# Patient Record
Sex: FEMALE | Race: BLACK OR AFRICAN AMERICAN | Employment: PART TIME | URBAN - NONMETROPOLITAN AREA
[De-identification: names, ages, dates, MRNs, and addresses within clinical notes are randomized per-mention and may not be internally consistent; named-entity substitution may affect disease eponyms.]

---

## 2020-08-18 VITALS
WEIGHT: 231.6 LBS | SYSTOLIC BLOOD PRESSURE: 131 MMHG | RESPIRATION RATE: 18 BRPM | TEMPERATURE: 97.9 F | BODY MASS INDEX: 34.3 KG/M2 | HEART RATE: 82 BPM | OXYGEN SATURATION: 96 % | HEIGHT: 69 IN | DIASTOLIC BLOOD PRESSURE: 80 MMHG

## 2020-08-18 DIAGNOSIS — M32.9 SYSTEMIC LUPUS ERYTHEMATOSUS, UNSPECIFIED SLE TYPE, UNSPECIFIED ORGAN INVOLVEMENT STATUS (HCC): ICD-10-CM

## 2020-08-18 DIAGNOSIS — R79.89 OTHER SPECIFIED ABNORMAL FINDINGS OF BLOOD CHEMISTRY: ICD-10-CM

## 2020-08-18 DIAGNOSIS — I10 ESSENTIAL (PRIMARY) HYPERTENSION: Chronic | ICD-10-CM

## 2020-08-18 DIAGNOSIS — R74.02 NONSPECIFIC ELEVATION OF LEVELS OF TRANSAMINASE AND LACTIC ACID DEHYDROGENASE (LDH): ICD-10-CM

## 2020-08-18 DIAGNOSIS — R74.01 NONSPECIFIC ELEVATION OF LEVELS OF TRANSAMINASE AND LACTIC ACID DEHYDROGENASE (LDH): ICD-10-CM

## 2020-08-18 DIAGNOSIS — E55.9 VITAMIN D DEFICIENCY, UNSPECIFIED: ICD-10-CM

## 2020-08-18 PROBLEM — R63.4 EXCESSIVE WEIGHT LOSS: Status: ACTIVE | Noted: 2017-12-27

## 2020-08-18 PROBLEM — R49.0 CHRONIC HOARSENESS: Status: ACTIVE | Noted: 2017-12-27

## 2020-08-18 PROBLEM — B02.9 HERPES ZOSTER: Status: ACTIVE | Noted: 2017-06-27

## 2020-08-18 PROBLEM — D64.9 ANEMIA: Status: ACTIVE | Noted: 2017-06-29

## 2020-08-18 PROBLEM — E88.09 HYPOALBUMINEMIA: Status: ACTIVE | Noted: 2018-01-09

## 2020-08-18 PROBLEM — R80.9 MICROALBUMINURIA: Status: ACTIVE | Noted: 2018-02-02

## 2020-08-18 PROBLEM — M19.90 UNSPECIFIED OSTEOARTHRITIS, UNSPECIFIED SITE: Status: ACTIVE | Noted: 2017-06-27

## 2020-08-18 RX ORDER — ALBUTEROL SULFATE 90 UG/1
2 AEROSOL, METERED RESPIRATORY (INHALATION)
COMMUNITY

## 2020-08-18 RX ORDER — CHOLECALCIFEROL (VITAMIN D3) 125 MCG
CAPSULE ORAL
COMMUNITY

## 2020-12-09 NOTE — PROGRESS NOTES
I am seeing this patient today virtually using HIPAA-compliant video-conferencing technology due to the COVID-19 Pandemic. The patient has previously provided full consent to use this technology and understands the risks and benefits of proceeding. I am seeing the patient today from my office in [place], [place]. The patient is in his/her home located within [place]. Patient already made aware that this is a virtual visit with provider and that for the purposes of billing, we will submit a claim for reimbursement with your insurance company. He/She was informed that he/she will be responsible for any copays, coinsurance amounts or other amounts not covered by his/her insurance company. Patient consented and accepted terms of virtual visit. THIS VISIT WAS CONDUCTED OVER THE TELEPHONE    HPI    Feliz Goddard is a 56 y.o. female and presents today for Hypertension (f/u)  Following up on chronic diseases today. She has a history of lupus and chronic osteoarthritis. She sees Rheumatology regularly. She also has a history of hypertension and takes medication daily for this. Home bps are ok. She takes all medications as prescribed. She does have history of chronic kidney disease in which she was seeing specialist in 2018 but has not been back to see them due to financial reasons. She has complaints of gerd today. Pap:  colonoscopy:5/2017  mammo:per gyn    Allergies    Allergies   Allergen Reactions    Codeine Other (comments)     Patient states she gets depressed when taking codeine. Medications    Current Outpatient Medications   Medication Sig Dispense    hydroCHLOROthiazide (MICROZIDE) 12.5 mg capsule Take 12.5 mg by mouth daily.  hydrOXYchloroQUINE (PLAQUENIL) 200 mg tablet Take 200 mg by mouth two (2) times a day.  predniSONE (DELTASONE) 5 mg tablet Take  by mouth daily.  ascorbic acid, vitamin C, (Vitamin C) 500 mg tablet Take  by mouth.      multivits,Stress Formula-Zinc tablet Take 1 Tab by mouth daily.  vitamin E (AQUA GEMS) 400 unit capsule Take  by mouth daily.  PNV Combo No.47-Iron-FA #1-DHA 27 mg iron-1 mg -300 mg cap Take  by mouth.  cholecalciferol, vitamin D3, (Vitamin D3) 50 mcg (2,000 unit) tab Take  by mouth. 3x a week     albuterol (Ventolin HFA) 90 mcg/actuation inhaler Take 2 Puffs by inhalation every four (4) hours as needed for Wheezing. No current facility-administered medications for this visit.          Screening  phq neg    Health Maintenance    Health Maintenance Due   Topic Date Due    Hepatitis C Screening  1960    PAP AKA CERVICAL CYTOLOGY  12/09/1979    Lipid Screen  12/09/1998    Colorectal Cancer Screening Combo  12/09/2008    Breast Cancer Screen Mammogram  12/09/2008    Flu Vaccine (1) 09/01/2020        Problem List    Patient Active Problem List    Diagnosis Date Noted    Gastroesophageal reflux disease without esophagitis 12/11/2020    Systemic lupus erythematosus, unspecified ([facility] [facility] 12/13/2019    Microalbuminuria 02/02/2018    Hypoalbuminemia 01/09/2018    Chronic hoarseness 12/27/2017    Excessive weight loss 12/27/2017    Other specified abnormal findings of blood chemistry 12/27/2017    Vitamin D deficiency, unspecified 07/12/2017    Anemia 06/29/2017    Nonspecific elevation of levels of transaminase and lactic acid dehydrogenase (LDH) 06/29/2017    Unspecified osteoarthritis, unspecified site 06/27/2017    Essential (primary) hypertension 06/27/2017    Herpes zoster 06/27/2017        Family Hx    Family History   Problem Relation Age of Onset    COPD Mother     Diabetes Father         Social Hx    Social History     Socioeconomic History    Marital status:      Spouse name: Not on file    Number of children: Not on file    Years of education: Not on file    Highest education level: Not on file   Tobacco Use    Smoking status: Never Smoker    Smokeless tobacco: Never Used   Substance and Sexual Activity    Alcohol use: Yes     Comment: occ    Drug use: Never   Other Topics Concern        Surgical Hx    Past Surgical History:   Procedure Laterality Date    HX COLONOSCOPY          Vitals    There were no vitals taken for this visit. Patient-Reported Vitals 12/11/2020   Patient-Reported Pulse 58   Patient-Reported Temperature 97.7   Patient-Reported Systolic  104   Patient-Reported Diastolic 76        ROS    Review of Systems   Constitutional: Negative for chills, fever and malaise/fatigue. HENT: Negative for congestion, ear pain, sinus pain and sore throat. Eyes: Negative for blurred vision and redness. Respiratory: Negative for cough, sputum production, shortness of breath and wheezing. Cardiovascular: Negative for chest pain, palpitations and leg swelling. Gastrointestinal: Negative for abdominal pain, constipation, diarrhea, heartburn, nausea and vomiting. Genitourinary: Negative for dysuria and hematuria. Musculoskeletal: Negative for falls, joint pain and myalgias. Skin: Negative for rash. Neurological: Negative for dizziness, seizures, weakness and headaches. Endo/Heme/Allergies: Does not bruise/bleed easily. Psychiatric/Behavioral: Negative for depression and suicidal ideas. The patient does not have insomnia. Physical Exam    Patient is a 56y.o. year old female     Physical exam was deferred due to [facility]- 19 precautions as visit was completed via telemedicine. Assessment/Plan  1. Essential (primary) hypertension  BPs are looking great; she is taking hydrochlorothiazide 12.5 mg daily; continue current regimen; Cont to monitor bp and report consistent readings >140/80 or <100/60.   - CBC W/O DIFF; Future  - METABOLIC PANEL, COMPREHENSIVE; Future  - LIPID PANEL; Future    2. Osteoarthritis, unspecified osteoarthritis type, unspecified site  Follows with rheumatology regularly    3.  Systemic lupus erythematosus, unspecified SLE type, unspecified organ involvement [facility]  Follows with rheumatology regularly    4. Vitamin D deficiency, unspecified  Currently on a supplement    5. Gastroesophageal reflux disease without esophagitis  Using famotidine as needed    6. Encounter for screening mammogram for malignant neoplasm of breast  Due: Ordering today  - [facility] MAMMO BI SCREENING INCL CAD; Future    7. Prediabetes  History of this; most recent A1c 8.7; will check labs and call with results  - HEMOGLOBIN A1C WITH EAG; Future    8. Encounter for hepatitis C screening test for low risk patient  Never had; ordering today  - HEPATITIS C AB         Health Maintenance Items reviewed with patient as noted. 25 minutes spent with patient/reviewing records during virtual appt discussing diagnoses, treatment options, and answering any patient questions.     No Carlson, MSN, FNP-BC

## 2020-12-11 ENCOUNTER — VIRTUAL VISIT (OUTPATIENT)
Dept: FAMILY MEDICINE CLINIC | Age: 60
End: 2020-12-11
Payer: COMMERCIAL

## 2020-12-11 DIAGNOSIS — M19.90 OSTEOARTHRITIS, UNSPECIFIED OSTEOARTHRITIS TYPE, UNSPECIFIED SITE: ICD-10-CM

## 2020-12-11 DIAGNOSIS — E55.9 VITAMIN D DEFICIENCY, UNSPECIFIED: ICD-10-CM

## 2020-12-11 DIAGNOSIS — R73.03 PREDIABETES: ICD-10-CM

## 2020-12-11 DIAGNOSIS — Z12.31 ENCOUNTER FOR SCREENING MAMMOGRAM FOR MALIGNANT NEOPLASM OF BREAST: ICD-10-CM

## 2020-12-11 DIAGNOSIS — M32.9 SYSTEMIC LUPUS ERYTHEMATOSUS, UNSPECIFIED SLE TYPE, UNSPECIFIED ORGAN INVOLVEMENT STATUS (HCC): ICD-10-CM

## 2020-12-11 DIAGNOSIS — K21.9 GASTROESOPHAGEAL REFLUX DISEASE WITHOUT ESOPHAGITIS: ICD-10-CM

## 2020-12-11 DIAGNOSIS — I10 ESSENTIAL (PRIMARY) HYPERTENSION: Chronic | ICD-10-CM

## 2020-12-11 DIAGNOSIS — Z11.59 ENCOUNTER FOR HEPATITIS C SCREENING TEST FOR LOW RISK PATIENT: ICD-10-CM

## 2020-12-11 DIAGNOSIS — I10 ESSENTIAL (PRIMARY) HYPERTENSION: Primary | Chronic | ICD-10-CM

## 2020-12-11 PROCEDURE — 99443 PR PHYS/QHP TELEPHONE EVALUATION 21-30 MIN: CPT | Performed by: NURSE PRACTITIONER

## 2020-12-11 RX ORDER — FAMOTIDINE 20 MG/1
20 TABLET, FILM COATED ORAL
COMMUNITY

## 2020-12-11 RX ORDER — HYDROCHLOROTHIAZIDE 12.5 MG/1
12.5 CAPSULE ORAL DAILY
COMMUNITY
End: 2021-01-08 | Stop reason: SDUPTHER

## 2020-12-11 RX ORDER — ASCORBIC ACID 500 MG
TABLET ORAL
COMMUNITY

## 2020-12-11 RX ORDER — VITAMIN E 268 MG
CAPSULE ORAL DAILY
COMMUNITY

## 2020-12-11 RX ORDER — PREDNISONE 5 MG/1
TABLET ORAL DAILY
COMMUNITY

## 2020-12-11 RX ORDER — HYDROXYCHLOROQUINE SULFATE 200 MG/1
200 TABLET, FILM COATED ORAL 2 TIMES DAILY
COMMUNITY

## 2020-12-11 NOTE — PROGRESS NOTES
Acid reflux concern. Has been taking OTC          Bertha Cross presents today for   Chief Complaint   Patient presents with    Hypertension     f/u       Depression Screening:  No flowsheet data found. Learning Assessment:  No flowsheet data found. Fall Risk  No flowsheet data found. ADL  No flowsheet data found. Health Maintenance reviewed and discussed and ordered per Provider. Health Maintenance Due   Topic Date Due    Hepatitis C Screening  1960    DTaP/Tdap/Td series (1 - Tdap) 12/09/1979    PAP AKA CERVICAL CYTOLOGY  12/09/1979    Lipid Screen  12/09/1998    Shingrix Vaccine Age 50> (1 of 2) 12/09/2008    Colorectal Cancer Screening Combo  12/09/2008    Breast Cancer Screen Mammogram  12/09/2008    Flu Vaccine (1) 09/01/2020   . Coordination of Care:  1. Have you been to the ER, urgent care clinic since your last visit? Hospitalized since your last visit? no    2. Have you seen or consulted any other health care providers outside of the [address] [place] [address] since your last visit? Include any pap smears or colon screening.  no

## 2021-01-29 LAB
CREATININE, EXTERNAL: 1.46
HBA1C MFR BLD HPLC: 5.9 %
LDL-C, EXTERNAL: 113

## 2021-02-01 NOTE — PROGRESS NOTES
I reviewed her blood work and noted that her kidney function is a little decreased when compared to previous labs done greater than 1 year ago;  I think she has seen nephrology in the past but I am not sure if she is seeing them within the past year; I would like her to reconnect with Dr. Marielle Houston for follow-up on her decreased kidney function; otherwise, all of her other labs look pretty good; her A1c is 5.9 which is technically mild prediabetes so I recommend she clean up her diet to include cutting down on carbs and sugar; increase physical activity as tolerated

## 2021-02-01 NOTE — PROGRESS NOTES
I reviewed her blood work; I note a decrease in kidney function when compared to previous labs; because of this, I would like her to see nephrology; I have put in referral; all other labs actually look good; her A1c is 5.9 just technically mild prediabetes so I would like her to clean up her diet and cut down on carbohydrates and sugars; increase physical activity as tolerated

## 2021-05-06 ENCOUNTER — OFFICE VISIT (OUTPATIENT)
Dept: FAMILY MEDICINE CLINIC | Age: 61
End: 2021-05-06
Payer: COMMERCIAL

## 2021-05-06 VITALS
WEIGHT: 239 LBS | HEART RATE: 63 BPM | BODY MASS INDEX: 35.4 KG/M2 | OXYGEN SATURATION: 96 % | HEIGHT: 69 IN | DIASTOLIC BLOOD PRESSURE: 85 MMHG | TEMPERATURE: 98.6 F | SYSTOLIC BLOOD PRESSURE: 128 MMHG

## 2021-05-06 DIAGNOSIS — I10 ESSENTIAL (PRIMARY) HYPERTENSION: Chronic | ICD-10-CM

## 2021-05-06 DIAGNOSIS — R73.01 IMPAIRED FASTING GLUCOSE: ICD-10-CM

## 2021-05-06 DIAGNOSIS — E66.9 CLASS 2 OBESITY WITH BODY MASS INDEX (BMI) OF 35.0 TO 35.9 IN ADULT, UNSPECIFIED OBESITY TYPE, UNSPECIFIED WHETHER SERIOUS COMORBIDITY PRESENT: ICD-10-CM

## 2021-05-06 DIAGNOSIS — M79.604 RIGHT LEG PAIN: ICD-10-CM

## 2021-05-06 DIAGNOSIS — M25.561 PAIN AND SWELLING OF RIGHT KNEE: ICD-10-CM

## 2021-05-06 DIAGNOSIS — Z12.4 SCREENING FOR MALIGNANT NEOPLASM OF CERVIX: ICD-10-CM

## 2021-05-06 DIAGNOSIS — I10 ESSENTIAL (PRIMARY) HYPERTENSION: Primary | Chronic | ICD-10-CM

## 2021-05-06 DIAGNOSIS — K21.9 GASTROESOPHAGEAL REFLUX DISEASE WITHOUT ESOPHAGITIS: ICD-10-CM

## 2021-05-06 DIAGNOSIS — M25.461 PAIN AND SWELLING OF RIGHT KNEE: ICD-10-CM

## 2021-05-06 DIAGNOSIS — Z12.31 ENCOUNTER FOR SCREENING MAMMOGRAM FOR MALIGNANT NEOPLASM OF BREAST: ICD-10-CM

## 2021-05-06 PROCEDURE — 99214 OFFICE O/P EST MOD 30 MIN: CPT | Performed by: NURSE PRACTITIONER

## 2021-05-06 RX ORDER — HYDROCHLOROTHIAZIDE 12.5 MG/1
TABLET ORAL
Qty: 90 TAB | Refills: 1 | Status: SHIPPED | OUTPATIENT
Start: 2021-05-06 | End: 2021-08-03 | Stop reason: SDUPTHER

## 2021-05-06 RX ORDER — NAPROXEN 500 MG/1
500 TABLET ORAL 2 TIMES DAILY WITH MEALS
Qty: 60 TAB | Refills: 0 | Status: SHIPPED | OUTPATIENT
Start: 2021-05-06 | End: 2021-06-05

## 2021-05-06 NOTE — PROGRESS NOTES
ТАТЬЯНА Goetz is a 56 y.o. female and presents today for Knee Pain (1 month)  She has a history of hypertension, lupus and chronic osteoarthritis. She follows with rheumatology regularly. She reports medication compliance. She complains of right knee pain for about a month now; she's had no known trauma, just her chronic history of osteoarthritis. She had an urgent care visit about a month or so ago for the right knee pain; had xrays done and states she was told she had arthritis in the knee. Says that the  provider wanted her to have an MRI. She reports that her right leg swells on occasion in the back of the knee; states at times it feels heavier than the other; she has numbness on occasion too in the right leg, behind the knee. when she crosses her right leg over her left, she feels pain in the knee. Allergies    Allergies   Allergen Reactions    Codeine Other (comments)     Patient states she gets depressed when taking codeine. Medications    Current Outpatient Medications   Medication Sig Dispense    hydroCHLOROthiazide (HYDRODIURIL) 12.5 mg tablet Take 1 tablet by mouth once daily for 90 days 90 Tab    naproxen (NAPROSYN) 500 mg tablet Take 1 Tab by mouth two (2) times daily (with meals) for 30 days. 60 Tab    hydrOXYchloroQUINE (PLAQUENIL) 200 mg tablet Take 200 mg by mouth two (2) times a day.  predniSONE (DELTASONE) 5 mg tablet Take  by mouth daily.  ascorbic acid, vitamin C, (Vitamin C) 500 mg tablet Take  by mouth.  multivits,Stress Formula-Zinc tablet Take 1 Tab by mouth daily.  vitamin E (AQUA GEMS) 400 unit capsule Take  by mouth daily.  PNV Combo No.47-Iron-FA #1-DHA 27 mg iron-1 mg -300 mg cap Take  by mouth.  famotidine (PEPCID) 20 mg tablet Take 20 mg by mouth daily as needed.  cholecalciferol, vitamin D3, (Vitamin D3) 50 mcg (2,000 unit) tab Take  by mouth.  3x a week     albuterol (Ventolin HFA) 90 mcg/actuation inhaler Take 2 Puffs by inhalation every four (4) hours as needed for Wheezing. No current facility-administered medications for this visit. Screening  On the basis of positive PHQ-9 screening ( ), C-SSRS completed.   Patient will follow-up as needed     Health Maintenance    Health Maintenance Due   Topic Date Due    COVID-19 Vaccine (1) Never done    PAP AKA CERVICAL CYTOLOGY  Never done    Colorectal Cancer Screening Combo  Never done    Breast Cancer Screen Mammogram  Never done        Problem List    Patient Active Problem List    Diagnosis Date Noted    Impaired fasting glucose 05/06/2021    Class 2 obesity with body mass index (BMI) of 35.0 to 35.9 in adult 05/06/2021    Gastroesophageal reflux disease without esophagitis 12/11/2020    [facility]ic lupus erythematosus, unspecified [facility] 12/13/2019    Microalbuminuria 02/02/2018    Hypoalbuminemia 01/09/2018    Chronic hoarseness 12/27/2017    Excessive weight loss 12/27/2017    Other specified abnormal findings of blood chemistry 12/27/2017    Vitamin D deficiency, unspecified 07/12/2017    Anemia 06/29/2017    Nonspecific elevation of levels of transaminase and lactic acid dehydrogenase (LDH) 06/29/2017    Unspecified osteoarthritis, unspecified site 06/27/2017    Essential (primary) hypertension 06/27/2017    Herpes zoster 06/27/2017        Family Hx    Family History   Problem Relation Age of Onset    COPD Mother     Diabetes Father         Social Hx    Social History     Socioeconomic History    Marital status:      Spouse name: Not on file    Number of children: Not on file    Years of education: Not on file    Highest education level: Not on file   Tobacco Use    Smoking status: Never Smoker    Smokeless tobacco: Never Used   Substance and Sexual Activity    Alcohol use: Yes     Comment: occ    Drug use: Never    Sexual activity: Not Currently   Other Topics Concern        Surgical Hx    Past Surgical History:   Procedure Laterality Date    HX COLONOSCOPY          Gyn Hx    As reported in history    Vitals    Visit Vitals  /85   Pulse 63   Temp 98.6 °F (37 °C)   Ht 5' 9\" (1.753 m)   Wt 239 lb (108.4 kg)   SpO2 96%   BMI 35.29 kg/m²        ROS    Review of Systems   Constitutional: Negative for chills, fever and malaise/fatigue. HENT: Negative for congestion, ear pain, sinus pain and sore throat. Eyes: Negative for blurred vision and redness. Respiratory: Negative for cough, sputum production, shortness of breath and wheezing. Cardiovascular: Negative for chest pain, palpitations and leg swelling. Gastrointestinal: Negative for abdominal pain, constipation, diarrhea, heartburn, nausea and vomiting. Genitourinary: Negative for dysuria and hematuria. Musculoskeletal: Positive for joint pain. Negative for falls and myalgias. Right leg pain   Skin: Negative for rash. Neurological: Negative for dizziness, seizures, weakness and headaches. Endo/Heme/Allergies: Does not bruise/bleed easily. Psychiatric/Behavioral: Negative for depression and suicidal ideas. The patient does not have insomnia. Physical Exam    Physical Exam  Vitals signs and nursing note reviewed. Constitutional:       General: She is not in acute distress. Appearance: Normal appearance. She is obese. She is not ill-appearing or toxic-appearing. HENT:      Head: Normocephalic and atraumatic. Right Ear: Tympanic membrane, ear canal and external ear normal. There is no impacted cerumen. Left Ear: Tympanic membrane, ear canal and external ear normal. There is no impacted cerumen. Nose: Nose normal. No congestion or rhinorrhea. Mouth/Throat:      Mouth: Mucous membranes are moist.      Pharynx: No oropharyngeal exudate or posterior oropharyngeal erythema. Eyes:      General:         Right eye: No discharge. Left eye: No discharge. Extraocular Movements: Extraocular movements intact. Conjunctiva/sclera: Conjunctivae normal.      Pupils: Pupils are equal, round, and reactive to light. Neck:      Musculoskeletal: Normal range of motion and neck supple. No muscular tenderness. Vascular: No carotid bruit. Cardiovascular:      Rate and Rhythm: Normal rate and regular rhythm. Pulses: Normal pulses. Heart sounds: Normal heart sounds. Pulmonary:      Effort: Pulmonary effort is normal. No respiratory distress. Breath sounds: Normal breath sounds. No wheezing, rhonchi or rales. Chest:      Chest wall: No tenderness. Abdominal:      General: Abdomen is flat. Bowel sounds are normal. There is no distension. Palpations: Abdomen is soft. There is no mass. Tenderness: There is no abdominal tenderness. There is no guarding or rebound. Hernia: No hernia is present. Musculoskeletal: Normal range of motion. General: Swelling (Right knee) and tenderness (right knee) present. Comments: Dorsiflexion of right ankle; patient reported pain in the right knee   Lymphadenopathy:      Cervical: No cervical adenopathy. Skin:     General: Skin is warm and dry. Capillary Refill: Capillary refill takes 2 to 3 seconds. Neurological:      General: No focal deficit present. Mental Status: She is alert and oriented to person, place, and time. Cranial Nerves: No cranial nerve deficit. Motor: No weakness. Gait: Gait normal.      Deep Tendon Reflexes: Reflexes normal.   Psychiatric:         Mood and Affect: Mood normal.         Behavior: Behavior normal.         Thought Content: Thought content normal.         Judgment: Judgment normal.          Assessment/Plan    1. Essential (primary) hypertension  BP today is stable; continue current regimen with HCTZ 12.5 mg daily; Cont to monitor bp and report consistent readings >130/80 or <100/60. Will update blood work; any changes to be made are contingent on the results.    - METABOLIC PANEL, COMPREHENSIVE; Future    2. Impaired fasting glucose  History of this; Will update blood work; any changes to be made are contingent on the results.   - HEMOGLOBIN A1C WITH EAG; Future    3. Gastroesophageal reflux disease without esophagitis  Doing well with famotidine; Discussed at length to avoid very acidic foods, alcohol, fatty food, onions, mints and caffeine as well as foods that aggravate symptoms. The patient was advised to also avoid eating late before going to bed, and would sleep with head elevated. 4. Encounter for screening mammogram for malignant neoplasm of breast  - [facility] MAMMO BI SCREENING INCL CAD; Future    5. Class 2 obesity with body mass index (BMI) of 35.0 to 35.9 in adult, unspecified obesity type, unspecified whether serious comorbidity present  BMI 35.2  Healthy diet/nutrition reviewed at length. Limit fast foods, FFF, soft drinks, late night eating, watch portion control. Smaller, more frequent meals/day. Work to increase daily physical activity as tolerated-goal 30 minutes at least 5 days a week. 6. Screening for malignant neoplasm of cervix  - REFERRAL TO GYNECOLOGY    7. Right leg pain  She will bring copies of the x-ray of her right knee from her urgent care visit; X1mo+; pain in knee with right ankle dorsiflexion; we will eval for DVT  - naproxen (NAPROSYN) 500 mg tablet; Take 1 Tab by mouth two (2) times daily (with meals) for 30 days. Dispense: 60 Tab; Refill: 0  - DUPLEX LOWER EXT VENOUS RIGHT; Future    8. Pain and swelling of right knee  She will bring copies of the x-ray of her right knee from her urgent care visit; X1mo+; pain in knee with right ankle dorsiflexion; we will eval for DVT  - naproxen (NAPROSYN) 500 mg tablet; Take 1 Tab by mouth two (2) times daily (with meals) for 30 days. Dispense: 60 Tab; Refill: 0        Health Maintenance Items reviewed with patient as noted.     Orders Placed This Encounter    [facility] MAMMO BI SCREENING INCL CAD    HEMOGLOBIN A1C WITH EAG    METABOLIC PANEL, COMPREHENSIVE    REFERRAL TO GYNECOLOGY    hydroCHLOROthiazide (HYDRODIURIL) 12.5 mg tablet    naproxen (NAPROSYN) 500 mg tablet        Kaden Owen, MSN, FNP-BC  tcg

## 2021-05-06 NOTE — PROGRESS NOTES
Aldo Velasquez presents today for   Chief Complaint   Patient presents with    Knee Pain     1 month       Is someone accompanying this pt? No     Is the patient using any DME equipment during OV? no    Depression Screening:  3 most recent PHQ Screens 5/6/2021   Little interest or pleasure in doing things Not at all   Feeling down, depressed, irritable, or hopeless Not at all   Total Score PHQ 2 0       Learning Assessment:  Learning Assessment 12/11/2020   PRIMARY LEARNER Patient   HIGHEST LEVEL OF EDUCATION - PRIMARY LEARNER  2 YEARS OF COLLEGE   BARRIERS PRIMARY LEARNER NONE   PRIMARY LANGUAGE ENGLISH   LEARNER PREFERENCE PRIMARY READING   ANSWERED BY patient   RELATIONSHIP SELF       Fall Risk  Fall Risk Assessment, last 12 mths 12/11/2020   Able to walk? Yes   Fall in past 12 months? No       ADL  ADL Assessment 5/6/2021   Feeding yourself No Help Needed   Getting from bed to chair No Help Needed   Getting dressed No Help Needed   Bathing or showering No Help Needed   Walk across the room (includes cane/walker) No Help Needed   Using the telphone No Help Needed   Taking your medications No Help Needed   Preparing meals No Help Needed   Managing money (expenses/bills) No Help Needed   Moderately strenuous housework (laundry) No Help Needed   Shopping for personal items (toiletries/medicines) No Help Needed   Shopping for groceries No Help Needed   Driving No Help Needed   Climbing a flight of stairs No Help Needed   Getting to places beyond walking distances No Help Needed       Health Maintenance reviewed and discussed and ordered per Provider. Health Maintenance Due   Topic Date Due    COVID-19 Vaccine (1) Never done    PAP AKA CERVICAL CYTOLOGY  Never done    Colorectal Cancer Screening Combo  Never done    Breast Cancer Screen Mammogram  Never done   . Coordination of Care:  1. Have you been to the ER, urgent care clinic since your last visit? Hospitalized since your last visit?  3 weeks- [facility] [place], for knee pain    2. Have you seen or consulted any other health care providers outside of the [address] [place] [address] since your last visit? Include any pap smears or colon screening.  No    Knee pain-Right, when sitting, driving- 1 month    Pap-due  Colon-2012, Normal-Dr. Shan Malloy [facility]  Mammo-Due

## 2021-05-07 DIAGNOSIS — M79.604 RIGHT LEG PAIN: ICD-10-CM

## 2021-05-07 NOTE — PROGRESS NOTES
Venous reflux; no dvt noted but the study was not the greatest; did we get her xrays? ? I would like to see them to determine if we want mri or referral to vascular

## 2021-05-13 ENCOUNTER — HOSPITAL ENCOUNTER (OUTPATIENT)
Dept: MAMMOGRAPHY | Age: 61
Discharge: HOME | End: 2021-05-13
Attending: NURSE PRACTITIONER
Payer: COMMERCIAL

## 2021-05-13 DIAGNOSIS — Z12.31 ENCOUNTER FOR SCREENING MAMMOGRAM FOR MALIGNANT NEOPLASM OF BREAST: ICD-10-CM

## 2021-05-13 PROCEDURE — 77067 SCR MAMMO BI INCL CAD: CPT

## 2021-06-21 ENCOUNTER — TELEPHONE (OUTPATIENT)
Dept: FAMILY MEDICINE CLINIC | Age: 61
End: 2021-06-21

## 2021-06-21 RX ORDER — NAPROXEN 500 MG/1
500 TABLET ORAL 2 TIMES DAILY WITH MEALS
COMMUNITY

## 2021-07-02 LAB
CREATININE, EXTERNAL: 1.11
HBA1C MFR BLD HPLC: 5.8 %

## 2021-07-06 NOTE — PROGRESS NOTES
I reviewed her blood work. Her A1c is 5.8 which is very borderline prediabetes. Continue to monitor her diet and cut down on sugars and carbs. Her kidney function shows very mild kidney disease. Could be age-related versus diabetes versus other. This is something that we can easily monitor with her follow-ups.   No changes at this time

## 2021-07-08 ENCOUNTER — DOCUMENTATION ONLY (OUTPATIENT)
Dept: FAMILY MEDICINE CLINIC | Age: 61
End: 2021-07-08

## 2021-08-03 ENCOUNTER — TELEPHONE (OUTPATIENT)
Dept: FAMILY MEDICINE CLINIC | Age: 61
End: 2021-08-03

## 2021-08-03 DIAGNOSIS — I10 ESSENTIAL (PRIMARY) HYPERTENSION: Chronic | ICD-10-CM

## 2021-08-03 RX ORDER — HYDROCHLOROTHIAZIDE 12.5 MG/1
TABLET ORAL
Qty: 90 TABLET | Refills: 1 | Status: SHIPPED | OUTPATIENT
Start: 2021-08-03

## 2021-08-03 NOTE — TELEPHONE ENCOUNTER
[facility]

## 2022-03-18 PROBLEM — E55.9 VITAMIN D DEFICIENCY, UNSPECIFIED: Status: ACTIVE | Noted: 2017-07-12

## 2022-03-18 PROBLEM — R63.4 EXCESSIVE WEIGHT LOSS: Status: ACTIVE | Noted: 2017-12-27

## 2022-03-18 PROBLEM — B02.9 HERPES ZOSTER: Status: ACTIVE | Noted: 2017-06-27

## 2022-03-18 PROBLEM — R80.9 MICROALBUMINURIA: Status: ACTIVE | Noted: 2018-02-02

## 2022-03-19 PROBLEM — R49.0 CHRONIC HOARSENESS: Status: ACTIVE | Noted: 2017-12-27

## 2022-03-19 PROBLEM — R73.01 IMPAIRED FASTING GLUCOSE: Status: ACTIVE | Noted: 2021-05-06

## 2022-03-19 PROBLEM — M32.9 SYSTEMIC LUPUS ERYTHEMATOSUS, UNSPECIFIED (HCC): Status: ACTIVE | Noted: 2019-12-13

## 2022-03-19 PROBLEM — K21.9 GASTROESOPHAGEAL REFLUX DISEASE WITHOUT ESOPHAGITIS: Status: ACTIVE | Noted: 2020-12-11

## 2022-03-19 PROBLEM — E66.9 CLASS 2 OBESITY WITH BODY MASS INDEX (BMI) OF 35.0 TO 35.9 IN ADULT: Status: ACTIVE | Noted: 2021-05-06

## 2022-03-19 PROBLEM — R79.89 OTHER SPECIFIED ABNORMAL FINDINGS OF BLOOD CHEMISTRY: Status: ACTIVE | Noted: 2017-12-27

## 2022-03-19 PROBLEM — E88.09 HYPOALBUMINEMIA: Status: ACTIVE | Noted: 2018-01-09

## 2022-03-19 PROBLEM — M19.90 UNSPECIFIED OSTEOARTHRITIS, UNSPECIFIED SITE: Status: ACTIVE | Noted: 2017-06-27

## 2022-03-19 PROBLEM — I10 ESSENTIAL (PRIMARY) HYPERTENSION: Status: ACTIVE | Noted: 2017-06-27

## 2022-03-19 PROBLEM — D64.9 ANEMIA: Status: ACTIVE | Noted: 2017-06-29

## 2022-03-20 PROBLEM — R74.02 NONSPECIFIC ELEVATION OF LEVELS OF TRANSAMINASE AND LACTIC ACID DEHYDROGENASE (LDH): Status: ACTIVE | Noted: 2017-06-29

## 2022-03-20 PROBLEM — R74.01 NONSPECIFIC ELEVATION OF LEVELS OF TRANSAMINASE AND LACTIC ACID DEHYDROGENASE (LDH): Status: ACTIVE | Noted: 2017-06-29

## 2023-03-10 ENCOUNTER — TRANSCRIBE ORDERS (OUTPATIENT)
Facility: HOSPITAL | Age: 63
End: 2023-03-10

## 2023-03-10 DIAGNOSIS — Z12.31 VISIT FOR SCREENING MAMMOGRAM: Primary | ICD-10-CM

## 2023-03-21 ENCOUNTER — HOSPITAL ENCOUNTER (OUTPATIENT)
Age: 63
Discharge: HOME | End: 2023-03-24
Payer: COMMERCIAL

## 2023-03-21 VITALS — BODY MASS INDEX: 35.4 KG/M2 | HEIGHT: 69 IN | WEIGHT: 239 LBS

## 2023-03-21 DIAGNOSIS — Z12.31 VISIT FOR SCREENING MAMMOGRAM: ICD-10-CM

## 2023-03-21 PROCEDURE — 77063 BREAST TOMOSYNTHESIS BI: CPT

## 2024-01-02 ENCOUNTER — HOSPITAL ENCOUNTER (EMERGENCY)
Age: 64
Discharge: HOME | End: 2024-01-02
Attending: EMERGENCY MEDICINE
Payer: MEDICARE

## 2024-01-02 ENCOUNTER — APPOINTMENT (OUTPATIENT)
Age: 64
End: 2024-01-02
Payer: MEDICARE

## 2024-01-02 VITALS
BODY MASS INDEX: 31.97 KG/M2 | HEART RATE: 100 BPM | DIASTOLIC BLOOD PRESSURE: 88 MMHG | WEIGHT: 236 LBS | RESPIRATION RATE: 17 BRPM | HEIGHT: 72 IN | TEMPERATURE: 99.4 F | SYSTOLIC BLOOD PRESSURE: 119 MMHG | OXYGEN SATURATION: 100 %

## 2024-01-02 DIAGNOSIS — J18.9 PNEUMONIA OF LEFT LOWER LOBE DUE TO INFECTIOUS ORGANISM: Primary | ICD-10-CM

## 2024-01-02 LAB
FLUAV AG NPH QL IA: NEGATIVE
FLUBV AG NOSE QL IA: NEGATIVE

## 2024-01-02 PROCEDURE — 71045 X-RAY EXAM CHEST 1 VIEW: CPT

## 2024-01-02 PROCEDURE — 99284 EMERGENCY DEPT VISIT MOD MDM: CPT

## 2024-01-02 PROCEDURE — 87804 INFLUENZA ASSAY W/OPTIC: CPT

## 2024-01-02 PROCEDURE — 6370000000 HC RX 637 (ALT 250 FOR IP): Performed by: EMERGENCY MEDICINE

## 2024-01-02 RX ORDER — DOXYCYCLINE HYCLATE 100 MG
100 TABLET ORAL 2 TIMES DAILY
Qty: 14 TABLET | Refills: 0 | Status: SHIPPED | OUTPATIENT
Start: 2024-01-02 | End: 2024-01-09

## 2024-01-02 RX ORDER — BENZONATATE 100 MG/1
100 CAPSULE ORAL 3 TIMES DAILY PRN
Qty: 20 CAPSULE | Refills: 0 | Status: SHIPPED | OUTPATIENT
Start: 2024-01-02 | End: 2024-01-09

## 2024-01-02 RX ORDER — ACETAMINOPHEN 325 MG/1
650 TABLET ORAL
Status: COMPLETED | OUTPATIENT
Start: 2024-01-02 | End: 2024-01-02

## 2024-01-02 RX ADMIN — ACETAMINOPHEN 650 MG: 325 TABLET ORAL at 18:58

## 2024-01-02 ASSESSMENT — LIFESTYLE VARIABLES
HOW MANY STANDARD DRINKS CONTAINING ALCOHOL DO YOU HAVE ON A TYPICAL DAY: 1 OR 2
HOW OFTEN DO YOU HAVE A DRINK CONTAINING ALCOHOL: MONTHLY OR LESS

## 2024-01-02 ASSESSMENT — PAIN - FUNCTIONAL ASSESSMENT: PAIN_FUNCTIONAL_ASSESSMENT: 0-10

## 2024-01-02 ASSESSMENT — PAIN SCALES - GENERAL: PAINLEVEL_OUTOF10: 0

## 2024-01-02 NOTE — DISCHARGE INSTRUCTIONS
Schedule Tylenol and ibuprofen alternating every 3 hours 1000 mg of Tylenol 600 of ibuprofen this will help keep the fever under control.    Tessalon Perles as needed for cough.  If your cough becomes more persistent would schedule every 8 hours for the next 3 days.

## 2024-01-04 NOTE — ED PROVIDER NOTES
send Flu swab and obtain CXR.     Flu neg    CXR independently reviewed by me, haziness in left lower lobe with small effusion, will start pt on Ab for pneumonia, pt will be dc home, no resp distress.     FINAL IMPRESSION     1. Pneumonia of left lower lobe due to infectious organism          DISPOSITION/PLAN   Willa Luz's  results have been reviewed with her.  She has been counseled regarding her diagnosis, treatment, and plan.  She verbally conveys understanding and agreement of the signs, symptoms, diagnosis, treatment and prognosis and additionally agrees to follow up as discussed.  She also agrees with the care-plan and conveys that all of her questions have been answered.  I have also provided discharge instructions for her that include: educational information regarding their diagnosis and treatment, and list of reasons why they would want to return to the ED prior to their follow-up appointment, should her condition change.     CLINICAL IMPRESSION    Discharge Note: The patient is stable for discharge home. The signs, symptoms, diagnosis, and discharge instructions have been discussed, understanding conveyed, and agreed upon. The patient is to follow up as recommended or return to ER should their symptoms worsen.      PATIENT REFERRED TO:  Nando Taylor MD  [address] [place] [id number] [phone]      As needed       DISCHARGE MEDICATIONS:     Medication List        START taking these medications      benzonatate 100 MG capsule  Commonly known as: TESSALON  Take 1 capsule by mouth 3 times daily as needed for Cough     doxycycline hyclate 100 MG tablet  Commonly known as: VIBRA-TABS  Take 1 tablet by mouth 2 times daily for 7 days            ASK your doctor about these medications      albuterol sulfate  (90 Base) MCG/ACT inhaler  Commonly known as: PROVENTIL;VENTOLIN;PROAIR     ascorbic acid 500 MG tablet  Commonly known as: VITAMIN C     Cholecalciferol 50 MCG (2000 UT) Tabs

## 2024-05-06 ENCOUNTER — TRANSCRIBE ORDERS (OUTPATIENT)
Facility: HOSPITAL | Age: 64
End: 2024-05-06

## 2024-05-06 DIAGNOSIS — Z12.31 VISIT FOR SCREENING MAMMOGRAM: Primary | ICD-10-CM

## 2024-05-17 ENCOUNTER — HOSPITAL ENCOUNTER (OUTPATIENT)
Age: 64
End: 2024-05-17
Payer: MEDICARE

## 2024-05-17 VITALS — WEIGHT: 232 LBS | HEIGHT: 72 IN | BODY MASS INDEX: 31.42 KG/M2

## 2024-05-17 DIAGNOSIS — Z12.31 VISIT FOR SCREENING MAMMOGRAM: ICD-10-CM

## 2024-05-17 PROCEDURE — 77063 BREAST TOMOSYNTHESIS BI: CPT
